# Patient Record
Sex: FEMALE | Race: WHITE | Employment: OTHER | URBAN - METROPOLITAN AREA
[De-identification: names, ages, dates, MRNs, and addresses within clinical notes are randomized per-mention and may not be internally consistent; named-entity substitution may affect disease eponyms.]

---

## 2024-04-20 ENCOUNTER — HOSPITAL ENCOUNTER (EMERGENCY)
Facility: HOSPITAL | Age: 49
Discharge: HOME | End: 2024-04-20
Attending: EMERGENCY MEDICINE
Payer: COMMERCIAL

## 2024-04-20 VITALS
HEART RATE: 81 BPM | DIASTOLIC BLOOD PRESSURE: 74 MMHG | RESPIRATION RATE: 18 BRPM | WEIGHT: 210 LBS | OXYGEN SATURATION: 97 % | BODY MASS INDEX: 34.99 KG/M2 | TEMPERATURE: 97.7 F | HEIGHT: 65 IN | SYSTOLIC BLOOD PRESSURE: 155 MMHG

## 2024-04-20 DIAGNOSIS — R09.81 NASAL CONGESTION: ICD-10-CM

## 2024-04-20 DIAGNOSIS — J06.9 VIRAL URI WITH COUGH: Primary | ICD-10-CM

## 2024-04-20 DIAGNOSIS — R09.82 POST-NASAL DRIP: ICD-10-CM

## 2024-04-20 LAB
FLUAV RNA RESP QL NAA+PROBE: NEGATIVE
FLUBV RNA RESP QL NAA+PROBE: POSITIVE
RSV RNA RESP QL NAA+PROBE: NEGATIVE
SARS-COV-2 RNA RESP QL NAA+PROBE: NEGATIVE

## 2024-04-20 PROCEDURE — 99283 EMERGENCY DEPT VISIT LOW MDM: CPT

## 2024-04-20 PROCEDURE — 99284 EMERGENCY DEPT VISIT MOD MDM: CPT | Performed by: EMERGENCY MEDICINE

## 2024-04-20 PROCEDURE — 0241U HB NFCT DS VIR RESP RNA 4 TRGT: CPT | Performed by: EMERGENCY MEDICINE

## 2024-04-26 NOTE — ED PROVIDER NOTES
History  Chief Complaint   Patient presents with    Cold Like Symptoms     Patient was caring for her family member that was ill and started with a HA, tight chest, persistent cough for two days.     50 yo female with a hx of anxiety presents for evaluation due to flu like symptoms. Onset of symptoms was approximately 2 days ago. Patient has a sick contact that is currently being evaluated in the ER for similar symptoms. Patient denies any current CP, SOB, N/V/D, fever, chills or diaphoresis. Patient has not taken medication over the counter to aid with the symptoms but has some cough syrup at home. Patient has no other complaints at this time.           Prior to Admission Medications   Prescriptions Last Dose Informant Patient Reported? Taking?   HYDROcodone-acetaminophen (NORCO) 5-325 mg per tablet   No No   Sig: Take 1 tablet by mouth every 4 (four) hours as needed for pain for up to 30 doses Max Daily Amount: 6 tablets   albuterol (PROVENTIL HFA,VENTOLIN HFA) 90 mcg/act inhaler   Yes No   Sig: Inhale 2 puffs every 6 (six) hours as needed for wheezing   ibuprofen (MOTRIN) 200 mg tablet   Yes No   Sig: Take 200 mg by mouth every 6 (six) hours as needed for mild pain   predniSONE 20 mg tablet   No No   Sig: Take 3 tablets (60 mg total) by mouth 2 (two) times a day.      Facility-Administered Medications: None       Past Medical History:   Diagnosis Date    Anxiety     Endometriosis     Schizophrenia (HCC)        Past Surgical History:   Procedure Laterality Date    LAPAROSCOPIC CHOLECYSTECTOMY  2001    IA PARATHYROIDECTOMY/EXPLORATION PARATHYROIDS Left 12/15/2016    Procedure: PARATHYROIDECTOMY;  Surgeon: Vianney Vargas MD;  Location: [facility];  Service: General    TUBAL LIGATION Bilateral 1998       History reviewed. No pertinent family history.  I have reviewed and agree with the history as documented.    E-Cigarette/Vaping     E-Cigarette/Vaping Substances     Social History     Tobacco Use    Smoking  status: Never   Substance Use Topics    Alcohol use: No    Drug use: No       Review of Systems   Constitutional:  Negative for chills and fever.   HENT:  Positive for postnasal drip, rhinorrhea and sore throat. Negative for ear pain.    Eyes:  Negative for pain and visual disturbance.   Respiratory:  Positive for cough. Negative for shortness of breath.    Cardiovascular:  Negative for chest pain and palpitations.   Gastrointestinal:  Negative for abdominal pain and vomiting.   Genitourinary:  Negative for dysuria and hematuria.   Musculoskeletal:  Negative for arthralgias and back pain.   Skin:  Negative for color change and rash.   Neurological:  Negative for seizures and syncope.   All other systems reviewed and are negative.      Physical Exam  Physical Exam  Vitals and nursing note reviewed.   Constitutional:       General: She is not in acute distress.     Appearance: Normal appearance. She is well-developed.   HENT:      Head: Normocephalic and atraumatic.      Nose: Congestion and rhinorrhea present.      Mouth/Throat:      Mouth: Mucous membranes are moist.      Pharynx: Oropharynx is clear.   Eyes:      Conjunctiva/sclera: Conjunctivae normal.   Cardiovascular:      Rate and Rhythm: Normal rate and regular rhythm.      Heart sounds: No murmur heard.  Pulmonary:      Effort: Pulmonary effort is normal. No respiratory distress.      Breath sounds: Normal breath sounds.   Abdominal:      Palpations: Abdomen is soft.      Tenderness: There is no abdominal tenderness.   Musculoskeletal:         General: No swelling.      Cervical back: Neck supple.   Skin:     General: Skin is warm and dry.      Capillary Refill: Capillary refill takes less than 2 seconds.   Neurological:      General: No focal deficit present.      Mental Status: She is alert and oriented to person, place, and time.   Psychiatric:         Mood and Affect: Mood normal.         Vital Signs  ED Triage Vitals [04/20/24 1312]   Temperature Pulse  Respirations Blood Pressure SpO2   97.7 °F (36.5 °C) 76 18 (!) 175/87 98 %      Temp Source Heart Rate Source Patient Position - Orthostatic VS BP Location FiO2 (%)   Oral Monitor Sitting Right arm --      Pain Score       --           Vitals:    04/20/24 1312 04/20/24 1315 04/20/24 1330   BP: (!) 175/87 (!) 175/87 155/74   Pulse: 76 77 81   Patient Position - Orthostatic VS: Sitting  Sitting         Visual Acuity      ED Medications  Medications - No data to display    Diagnostic Studies  Results Reviewed       Procedure Component Value Units Date/Time    FLU/RSV/COVID - if FLU/RSV clinically relevant [[id number]]  (Abnormal) Collected: 04/20/24 1337    Lab Status: Final result Specimen: Nares from Nose Updated: 04/20/24 1422     SARS-CoV-2 Negative     INFLUENZA A PCR Negative     INFLUENZA B PCR Positive     RSV PCR Negative    Narrative:      FOR PEDIATRIC PATIENTS - copy/paste COVID Guidelines URL to browser: [email or web address]    SARS-CoV-2 assay is a Nucleic Acid Amplification assay intended for the  qualitative detection of nucleic acid from SARS-CoV-2 in nasopharyngeal  swabs. Results are for the presumptive identification of SARS-CoV-2 RNA.    Positive results are indicative of infection with SARS-CoV-2, the virus  causing COVID-19, but do not rule out bacterial infection or co-infection  with other viruses. Laboratories within the [place] and its  territories are required to report all positive results to the appropriate  public health authorities. Negative results do not preclude SARS-CoV-2  infection and should not be used as the sole basis for treatment or other  patient management decisions. Negative results must be combined with  clinical observations, patient history, and epidemiological information.  This test has not been FDA cleared or approved.    This test has been authorized by FDA under an Emergency Use Authorization  (EUA). This  test is only authorized for the duration of time the  declaration that circumstances exist justifying the authorization of the  emergency use of an in vitro diagnostic tests for detection of SARS-CoV-2  virus and/or diagnosis of COVID-19 infection under section 564(b)(1) of  the Act, 21 U.S.C. 360bbb-3(b)(1), unless the authorization is terminated  or revoked sooner. The test has been validated but independent review by FDA  and CLIA is pending.    Test performed using [employer] GeneXpert: This RT-PCR assay targets N2,  a region unique to SARS-CoV-2. A conserved region in the E-gene was chosen  for pan-Sarbecovirus detection which includes SARS-CoV-2.    According to CMS-2020-01-R, this platform meets the definition of high-throughput technology.                   No orders to display              Procedures  Procedures         ED Course                               SBIRT 22yo+      Flowsheet Row Most Recent Value   Initial Alcohol Screen: US AUDIT-C     1. How often do you have a drink containing alcohol? 0 Filed at: 04/20/2024 1312   2. How many drinks containing alcohol do you have on a typical day you are drinking?  0 Filed at: 04/20/2024 1312   3a. Male UNDER 65: How often do you have five or more drinks on one occasion? 0 Filed at: 04/20/2024 1312   3b. FEMALE Any Age, or MALE 65+: How often do you have 4 or more drinks on one occassion? 0 Filed at: 04/20/2024 1312   Audit-C Score 0 Filed at: 04/20/2024 1312   MARANDA: How many times in the past year have you...    Used an illegal drug or used a prescription medication for non-medical reasons? Never Filed at: 04/20/2024 1312                      Medical Decision Making  This patient presents with symptoms suspicious for likely viral upper respiratory infection. Based on history and physical doubt sinusitis. COVID/ Flu/ RSV  test was sent off and pending. Do not suspect underlying cardiopulmonary process. I considered, but think unlikely, dangerous causes of this  patient's symptoms to include ACS, CHF or COPD exacerbations, pneumonia, pneumothorax. Patient is nontoxic appearing and not in need of emergent medical intervention. Patient educated in proper hydration and symptomatic management with over the counter medication. Patient was given strict instructions when to return the emergency room.              Disposition  Final diagnoses:   Viral URI with cough   Nasal congestion   Post-nasal drip     Time reflects when diagnosis was documented in both MDM as applicable and the Disposition within this note       Time User Action Codes Description Comment    4/20/2024  1:55 PM Laura Segura Add [J06.9] Viral URI with cough     4/20/2024  1:55 PM Laura Segura Add [R09.81] Nasal congestion     4/20/2024  1:56 PM Laura Segura Add [R09.82] Post-nasal drip           ED Disposition       ED Disposition   Discharge    Condition   Stable    Date/Time   Sat Apr 20, 2024 1355    Comment   Lizzy Sanchez discharge to home/self care.                   Follow-up Information       Follow up With Specialties Details Why Contact Info    Colleen Castro MD Family Medicine Schedule an appointment as soon as possible for a visit in 1 week If symptoms worsen [address] [place] [address]  [phone]              Discharge Medication List as of 4/20/2024  1:57 PM        CONTINUE these medications which have NOT CHANGED    Details   albuterol (PROVENTIL HFA,VENTOLIN HFA) 90 mcg/act inhaler Inhale 2 puffs every 6 (six) hours as needed for wheezing, Until Discontinued, Historical Med      HYDROcodone-acetaminophen (NORCO) 5-325 mg per tablet Take 1 tablet by mouth every 4 (four) hours as needed for pain for up to 30 doses Max Daily Amount: 6 tablets, Starting 12/15/2016, Until Discontinued, Print      ibuprofen (MOTRIN) 200 mg tablet Take 200 mg by mouth every 6 (six) hours as needed for mild pain, Until Discontinued, Historical Med      predniSONE 20 mg tablet Take 3 tablets (60  mg total) by mouth 2 (two) times a day., Starting 6/25/2016, Until Discontinued, Normal             No discharge procedures on file.    PDMP Review       None            ED Provider  Electronically Signed by             Laura Segura MD  04/26/24 [id number]